# Patient Record
Sex: FEMALE
[De-identification: names, ages, dates, MRNs, and addresses within clinical notes are randomized per-mention and may not be internally consistent; named-entity substitution may affect disease eponyms.]

---

## 2019-08-06 ENCOUNTER — HOSPITAL ENCOUNTER (OUTPATIENT)
Dept: HOSPITAL 88 - RAD | Age: 49
End: 2019-08-06
Attending: INTERNAL MEDICINE
Payer: COMMERCIAL

## 2019-08-06 DIAGNOSIS — M47.812: Primary | ICD-10-CM

## 2019-08-06 DIAGNOSIS — M47.816: ICD-10-CM

## 2019-08-06 PROCEDURE — 72220 X-RAY EXAM SACRUM TAILBONE: CPT

## 2019-08-06 PROCEDURE — 72110 X-RAY EXAM L-2 SPINE 4/>VWS: CPT

## 2019-08-06 PROCEDURE — 72050 X-RAY EXAM NECK SPINE 4/5VWS: CPT

## 2019-08-06 NOTE — DIAGNOSTIC IMAGING REPORT
EXAMINATION:  CERVICAL SPINE 4 OR 5 VIEWS, SP LUMBAR, COMPLETE MIN 4VW, SACRUM

X-RAY    



INDICATION: Pain



COMPARISON: None

     

FINDINGS:



Cervical spine: No acute fracture or dislocation. Cervical spine alignment is

normal. The prevertebral soft tissues are normal in thickness. Mild multilevel

degenerative changes.



Lumbar spine: No acute fracture or dislocation. Alignment is anatomic.

Vertebral body heights are maintained. Minimal multilevel degenerative changes

particularly at L5-S1 with disc space narrowing and small osteophyte formation.

Nonobstructive bowel gas pattern. Status post cholecystectomy.



Sacrum: No acute fracture or dislocation. Bowel gas overlies the sacrum on AP

projection.



IMPRESSION: 

No acute osseous injury. Anatomic alignment.



Signed by: Henry Gillis MD on 8/6/2019 11:11 AM

## 2020-07-27 ENCOUNTER — HOSPITAL ENCOUNTER (OUTPATIENT)
Dept: HOSPITAL 88 - MAMMO | Age: 50
End: 2020-07-27
Attending: INTERNAL MEDICINE
Payer: COMMERCIAL

## 2020-07-27 DIAGNOSIS — Z12.31: Primary | ICD-10-CM

## 2020-07-27 PROCEDURE — 77067 SCR MAMMO BI INCL CAD: CPT

## 2021-05-14 ENCOUNTER — HOSPITAL ENCOUNTER (INPATIENT)
Dept: HOSPITAL 88 - ER | Age: 51
LOS: 3 days | Discharge: HOME | DRG: 393 | End: 2021-05-17
Attending: INTERNAL MEDICINE | Admitting: INTERNAL MEDICINE
Payer: COMMERCIAL

## 2021-05-14 VITALS — HEIGHT: 63 IN | BODY MASS INDEX: 31.18 KG/M2 | WEIGHT: 176 LBS

## 2021-05-14 VITALS — DIASTOLIC BLOOD PRESSURE: 80 MMHG | SYSTOLIC BLOOD PRESSURE: 125 MMHG

## 2021-05-14 VITALS — DIASTOLIC BLOOD PRESSURE: 67 MMHG | SYSTOLIC BLOOD PRESSURE: 140 MMHG

## 2021-05-14 DIAGNOSIS — K64.8: ICD-10-CM

## 2021-05-14 DIAGNOSIS — D12.0: ICD-10-CM

## 2021-05-14 DIAGNOSIS — D50.9: ICD-10-CM

## 2021-05-14 DIAGNOSIS — Z20.822: ICD-10-CM

## 2021-05-14 DIAGNOSIS — K25.4: ICD-10-CM

## 2021-05-14 DIAGNOSIS — K29.71: ICD-10-CM

## 2021-05-14 DIAGNOSIS — K31.7: Primary | ICD-10-CM

## 2021-05-14 DIAGNOSIS — D64.9: ICD-10-CM

## 2021-05-14 DIAGNOSIS — E11.9: ICD-10-CM

## 2021-05-14 DIAGNOSIS — K21.9: ICD-10-CM

## 2021-05-14 DIAGNOSIS — K20.90: ICD-10-CM

## 2021-05-14 DIAGNOSIS — K29.70: ICD-10-CM

## 2021-05-14 LAB
ALBUMIN SERPL-MCNC: 3.7 G/DL (ref 3.5–5)
ALBUMIN/GLOB SERPL: 1.2 {RATIO} (ref 0.8–2)
ALP SERPL-CCNC: 103 IU/L (ref 40–150)
ALT SERPL-CCNC: 37 IU/L (ref 0–55)
ANION GAP SERPL CALC-SCNC: 12.1 MMOL/L (ref 8–16)
BASOPHILS # BLD AUTO: 0 10*3/UL (ref 0–0.1)
BASOPHILS NFR BLD AUTO: 0.5 % (ref 0–1)
BUN SERPL-MCNC: 10 MG/DL (ref 7–26)
BUN/CREAT SERPL: 13 (ref 6–25)
CALCIUM SERPL-MCNC: 8.6 MG/DL (ref 8.4–10.2)
CHLORIDE SERPL-SCNC: 107 MMOL/L (ref 98–107)
CK MB SERPL-MCNC: 2 NG/ML (ref 0–5)
CK SERPL-CCNC: 550 IU/L (ref 29–168)
CO2 SERPL-SCNC: 25 MMOL/L (ref 22–29)
DEPRECATED INR PLAS: 0.88
DEPRECATED NEUTROPHILS # BLD AUTO: 3 10*3/UL (ref 2.1–6.9)
EGFRCR SERPLBLD CKD-EPI 2021: > 60 ML/MIN (ref 60–?)
EOSINOPHIL # BLD AUTO: 0.1 10*3/UL (ref 0–0.4)
EOSINOPHIL NFR BLD AUTO: 2.1 % (ref 0–6)
ERYTHROCYTE [DISTWIDTH] IN CORD BLOOD: 17.9 % (ref 11.7–14.4)
GLOBULIN PLAS-MCNC: 3.2 G/DL (ref 2.3–3.5)
GLUCOSE SERPLBLD-MCNC: 193 MG/DL (ref 74–118)
HCT VFR BLD AUTO: 20.5 % (ref 34.2–44.1)
HGB BLD-MCNC: 6 G/DL (ref 12–16)
LYMPHOCYTES # BLD: 2 10*3/UL (ref 1–3.2)
LYMPHOCYTES NFR BLD AUTO: 36.3 % (ref 18–39.1)
MCH RBC QN AUTO: 23.5 PG (ref 28–32)
MCHC RBC AUTO-ENTMCNC: 29.3 G/DL (ref 31–35)
MCV RBC AUTO: 80.4 FL (ref 81–99)
MONOCYTES # BLD AUTO: 0.4 10*3/UL (ref 0.2–0.8)
MONOCYTES NFR BLD AUTO: 7.3 % (ref 4.4–11.3)
NEUTS SEG NFR BLD AUTO: 53.3 % (ref 38.7–80)
PLATELET # BLD AUTO: 359 X10E3/UL (ref 140–360)
POTASSIUM SERPL-SCNC: 4.1 MMOL/L (ref 3.5–5.1)
PROTHROMBIN TIME: 12.5 SECONDS (ref 11.9–14.5)
RBC # BLD AUTO: 2.55 X10E6/UL (ref 3.6–5.1)
SODIUM SERPL-SCNC: 140 MMOL/L (ref 136–145)

## 2021-05-14 PROCEDURE — 82270 OCCULT BLOOD FECES: CPT

## 2021-05-14 PROCEDURE — 83036 HEMOGLOBIN GLYCOSYLATED A1C: CPT

## 2021-05-14 PROCEDURE — 85018 HEMOGLOBIN: CPT

## 2021-05-14 PROCEDURE — 82607 VITAMIN B-12: CPT

## 2021-05-14 PROCEDURE — 84100 ASSAY OF PHOSPHORUS: CPT

## 2021-05-14 PROCEDURE — 88312 SPECIAL STAINS GROUP 1: CPT

## 2021-05-14 PROCEDURE — 43239 EGD BIOPSY SINGLE/MULTIPLE: CPT

## 2021-05-14 PROCEDURE — 85045 AUTOMATED RETICULOCYTE COUNT: CPT

## 2021-05-14 PROCEDURE — 45380 COLONOSCOPY AND BIOPSY: CPT

## 2021-05-14 PROCEDURE — 36415 COLL VENOUS BLD VENIPUNCTURE: CPT

## 2021-05-14 PROCEDURE — 80053 COMPREHEN METABOLIC PANEL: CPT

## 2021-05-14 PROCEDURE — 82728 ASSAY OF FERRITIN: CPT

## 2021-05-14 PROCEDURE — 84484 ASSAY OF TROPONIN QUANT: CPT

## 2021-05-14 PROCEDURE — 82553 CREATINE MB FRACTION: CPT

## 2021-05-14 PROCEDURE — 99284 EMERGENCY DEPT VISIT MOD MDM: CPT

## 2021-05-14 PROCEDURE — 86850 RBC ANTIBODY SCREEN: CPT

## 2021-05-14 PROCEDURE — 85610 PROTHROMBIN TIME: CPT

## 2021-05-14 PROCEDURE — 86920 COMPATIBILITY TEST SPIN: CPT

## 2021-05-14 PROCEDURE — 84466 ASSAY OF TRANSFERRIN: CPT

## 2021-05-14 PROCEDURE — 88305 TISSUE EXAM BY PATHOLOGIST: CPT

## 2021-05-14 PROCEDURE — 83735 ASSAY OF MAGNESIUM: CPT

## 2021-05-14 PROCEDURE — 83540 ASSAY OF IRON: CPT

## 2021-05-14 PROCEDURE — 82550 ASSAY OF CK (CPK): CPT

## 2021-05-14 PROCEDURE — 85014 HEMATOCRIT: CPT

## 2021-05-14 PROCEDURE — 85025 COMPLETE CBC W/AUTO DIFF WBC: CPT

## 2021-05-14 PROCEDURE — 86900 BLOOD TYPING SEROLOGIC ABO: CPT

## 2021-05-14 PROCEDURE — 80048 BASIC METABOLIC PNL TOTAL CA: CPT

## 2021-05-14 PROCEDURE — 82746 ASSAY OF FOLIC ACID SERUM: CPT

## 2021-05-15 VITALS — SYSTOLIC BLOOD PRESSURE: 124 MMHG | DIASTOLIC BLOOD PRESSURE: 84 MMHG

## 2021-05-15 VITALS — DIASTOLIC BLOOD PRESSURE: 84 MMHG | SYSTOLIC BLOOD PRESSURE: 124 MMHG

## 2021-05-15 VITALS — SYSTOLIC BLOOD PRESSURE: 145 MMHG | DIASTOLIC BLOOD PRESSURE: 81 MMHG

## 2021-05-15 VITALS — DIASTOLIC BLOOD PRESSURE: 67 MMHG | SYSTOLIC BLOOD PRESSURE: 140 MMHG

## 2021-05-15 VITALS — DIASTOLIC BLOOD PRESSURE: 80 MMHG | SYSTOLIC BLOOD PRESSURE: 129 MMHG

## 2021-05-15 VITALS — DIASTOLIC BLOOD PRESSURE: 75 MMHG | SYSTOLIC BLOOD PRESSURE: 120 MMHG

## 2021-05-15 VITALS — SYSTOLIC BLOOD PRESSURE: 134 MMHG | DIASTOLIC BLOOD PRESSURE: 77 MMHG

## 2021-05-15 VITALS — SYSTOLIC BLOOD PRESSURE: 128 MMHG | DIASTOLIC BLOOD PRESSURE: 80 MMHG

## 2021-05-15 LAB
ALBUMIN SERPL-MCNC: 3.5 G/DL (ref 3.5–5)
ALBUMIN/GLOB SERPL: 1.2 {RATIO} (ref 0.8–2)
ALP SERPL-CCNC: 85 IU/L (ref 40–150)
ALT SERPL-CCNC: 36 IU/L (ref 0–55)
ANION GAP SERPL CALC-SCNC: 12.7 MMOL/L (ref 8–16)
BASOPHILS # BLD AUTO: 0 10*3/UL (ref 0–0.1)
BASOPHILS NFR BLD AUTO: 0.8 % (ref 0–1)
BUN SERPL-MCNC: 6 MG/DL (ref 7–26)
BUN/CREAT SERPL: 9 (ref 6–25)
CALCIUM SERPL-MCNC: 8.7 MG/DL (ref 8.4–10.2)
CHLORIDE SERPL-SCNC: 109 MMOL/L (ref 98–107)
CO2 SERPL-SCNC: 22 MMOL/L (ref 22–29)
DEPRECATED NEUTROPHILS # BLD AUTO: 2.6 10*3/UL (ref 2.1–6.9)
EGFRCR SERPLBLD CKD-EPI 2021: > 60 ML/MIN (ref 60–?)
EOSINOPHIL # BLD AUTO: 0.1 10*3/UL (ref 0–0.4)
EOSINOPHIL NFR BLD AUTO: 2.3 % (ref 0–6)
ERYTHROCYTE [DISTWIDTH] IN CORD BLOOD: 17.2 % (ref 11.7–14.4)
FERRITIN SERPL-MCNC: 2.33 NG/ML (ref 4.63–204)
GLOBULIN PLAS-MCNC: 3 G/DL (ref 2.3–3.5)
GLUCOSE SERPLBLD-MCNC: 160 MG/DL (ref 74–118)
HCT VFR BLD AUTO: 29.5 % (ref 34.2–44.1)
HCT VFR BLD AUTO: 30.8 % (ref 34.2–44.1)
HGB BLD-MCNC: 9.1 G/DL (ref 12–16)
HGB BLD-MCNC: 9.6 G/DL (ref 12–16)
IRON SATN MFR SERPL: 3 % (ref 15–50)
IRON SERPL-MCNC: 15 UG/DL (ref 50–170)
LYMPHOCYTES # BLD: 1.6 10*3/UL (ref 1–3.2)
LYMPHOCYTES NFR BLD AUTO: 32.6 % (ref 18–39.1)
MCH RBC QN AUTO: 25.4 PG (ref 28–32)
MCHC RBC AUTO-ENTMCNC: 30.8 G/DL (ref 31–35)
MCV RBC AUTO: 82.4 FL (ref 81–99)
MONOCYTES # BLD AUTO: 0.5 10*3/UL (ref 0.2–0.8)
MONOCYTES NFR BLD AUTO: 9.4 % (ref 4.4–11.3)
NEUTS SEG NFR BLD AUTO: 54.1 % (ref 38.7–80)
PLATELET # BLD AUTO: 322 X10E3/UL (ref 140–360)
POTASSIUM SERPL-SCNC: 3.7 MMOL/L (ref 3.5–5.1)
RBC # BLD AUTO: 3.58 X10E6/UL (ref 3.6–5.1)
SODIUM SERPL-SCNC: 140 MMOL/L (ref 136–145)
TIBC SERPL-MCNC: 556 UG/DL (ref 261–478)
TRANSFERRIN SERPL-MCNC: 397 MG/DL (ref 180–382)

## 2021-05-15 PROCEDURE — 30233N1 TRANSFUSION OF NONAUTOLOGOUS RED BLOOD CELLS INTO PERIPHERAL VEIN, PERCUTANEOUS APPROACH: ICD-10-PCS | Performed by: INTERNAL MEDICINE

## 2021-05-15 RX ADMIN — SODIUM CHLORIDE SCH MLS/HR: 900 INJECTION INTRAVENOUS at 17:35

## 2021-05-15 RX ADMIN — SODIUM CHLORIDE SCH MLS/HR: 900 INJECTION INTRAVENOUS at 14:48

## 2021-05-16 VITALS — DIASTOLIC BLOOD PRESSURE: 72 MMHG | SYSTOLIC BLOOD PRESSURE: 98 MMHG

## 2021-05-16 VITALS — SYSTOLIC BLOOD PRESSURE: 132 MMHG | DIASTOLIC BLOOD PRESSURE: 79 MMHG

## 2021-05-16 VITALS — DIASTOLIC BLOOD PRESSURE: 78 MMHG | SYSTOLIC BLOOD PRESSURE: 143 MMHG

## 2021-05-16 VITALS — SYSTOLIC BLOOD PRESSURE: 112 MMHG | DIASTOLIC BLOOD PRESSURE: 71 MMHG

## 2021-05-16 VITALS — SYSTOLIC BLOOD PRESSURE: 98 MMHG | DIASTOLIC BLOOD PRESSURE: 72 MMHG

## 2021-05-16 VITALS — DIASTOLIC BLOOD PRESSURE: 69 MMHG | SYSTOLIC BLOOD PRESSURE: 118 MMHG

## 2021-05-16 VITALS — SYSTOLIC BLOOD PRESSURE: 137 MMHG | DIASTOLIC BLOOD PRESSURE: 92 MMHG

## 2021-05-16 VITALS — DIASTOLIC BLOOD PRESSURE: 83 MMHG | SYSTOLIC BLOOD PRESSURE: 111 MMHG

## 2021-05-16 VITALS — DIASTOLIC BLOOD PRESSURE: 79 MMHG | SYSTOLIC BLOOD PRESSURE: 132 MMHG

## 2021-05-16 LAB
ANION GAP SERPL CALC-SCNC: 15.5 MMOL/L (ref 8–16)
BASOPHILS # BLD AUTO: 0.1 10*3/UL (ref 0–0.1)
BASOPHILS NFR BLD AUTO: 0.7 % (ref 0–1)
BUN SERPL-MCNC: 9 MG/DL (ref 7–26)
BUN/CREAT SERPL: 11 (ref 6–25)
CALCIUM SERPL-MCNC: 9.5 MG/DL (ref 8.4–10.2)
CHLORIDE SERPL-SCNC: 107 MMOL/L (ref 98–107)
CO2 SERPL-SCNC: 21 MMOL/L (ref 22–29)
DEPRECATED NEUTROPHILS # BLD AUTO: 4.3 10*3/UL (ref 2.1–6.9)
DEPRECATED PHOSPHATE SERPL-MCNC: 4.7 MG/DL (ref 2.3–4.7)
EGFRCR SERPLBLD CKD-EPI 2021: > 60 ML/MIN (ref 60–?)
EOSINOPHIL # BLD AUTO: 0.2 10*3/UL (ref 0–0.4)
EOSINOPHIL NFR BLD AUTO: 2.4 % (ref 0–6)
ERYTHROCYTE [DISTWIDTH] IN CORD BLOOD: 17.5 % (ref 11.7–14.4)
GLUCOSE SERPLBLD-MCNC: 116 MG/DL (ref 74–118)
HCT VFR BLD AUTO: 36.9 % (ref 34.2–44.1)
HGB BLD-MCNC: 11.2 G/DL (ref 12–16)
LYMPHOCYTES # BLD: 1.8 10*3/UL (ref 1–3.2)
LYMPHOCYTES NFR BLD AUTO: 25.4 % (ref 18–39.1)
MAGNESIUM SERPL-MCNC: 2.8 MG/DL (ref 1.3–2.1)
MCH RBC QN AUTO: 25.5 PG (ref 28–32)
MCHC RBC AUTO-ENTMCNC: 30.4 G/DL (ref 31–35)
MCV RBC AUTO: 83.9 FL (ref 81–99)
MONOCYTES # BLD AUTO: 0.8 10*3/UL (ref 0.2–0.8)
MONOCYTES NFR BLD AUTO: 10.7 % (ref 4.4–11.3)
NEUTS SEG NFR BLD AUTO: 60.2 % (ref 38.7–80)
PLATELET # BLD AUTO: 392 X10E3/UL (ref 140–360)
POTASSIUM SERPL-SCNC: 3.5 MMOL/L (ref 3.5–5.1)
RBC # BLD AUTO: 4.4 X10E6/UL (ref 3.6–5.1)
SODIUM SERPL-SCNC: 140 MMOL/L (ref 136–145)

## 2021-05-16 PROCEDURE — 0DB78ZX EXCISION OF STOMACH, PYLORUS, VIA NATURAL OR ARTIFICIAL OPENING ENDOSCOPIC, DIAGNOSTIC: ICD-10-PCS | Performed by: INTERNAL MEDICINE

## 2021-05-16 PROCEDURE — 0DB98ZX EXCISION OF DUODENUM, VIA NATURAL OR ARTIFICIAL OPENING ENDOSCOPIC, DIAGNOSTIC: ICD-10-PCS | Performed by: INTERNAL MEDICINE

## 2021-05-16 PROCEDURE — 0DBH8ZX EXCISION OF CECUM, VIA NATURAL OR ARTIFICIAL OPENING ENDOSCOPIC, DIAGNOSTIC: ICD-10-PCS | Performed by: INTERNAL MEDICINE

## 2021-05-16 RX ADMIN — SODIUM CHLORIDE SCH MLS/HR: 900 INJECTION INTRAVENOUS at 05:25

## 2021-05-16 RX ADMIN — SODIUM CHLORIDE SCH MLS/HR: 900 INJECTION INTRAVENOUS at 15:26

## 2021-05-16 RX ADMIN — SODIUM CHLORIDE SCH MLS/HR: 900 INJECTION INTRAVENOUS at 08:18

## 2021-05-16 RX ADMIN — SODIUM CHLORIDE SCH MLS/HR: 900 INJECTION INTRAVENOUS at 00:17

## 2021-05-16 RX ADMIN — SODIUM CHLORIDE SCH MLS/HR: 900 INJECTION INTRAVENOUS at 18:20

## 2021-05-16 RX ADMIN — SODIUM CHLORIDE SCH MLS/HR: 9 INJECTION, SOLUTION INTRAVENOUS at 10:45

## 2021-05-17 VITALS — SYSTOLIC BLOOD PRESSURE: 102 MMHG | DIASTOLIC BLOOD PRESSURE: 57 MMHG

## 2021-05-17 VITALS — SYSTOLIC BLOOD PRESSURE: 122 MMHG | DIASTOLIC BLOOD PRESSURE: 71 MMHG

## 2021-05-17 VITALS — DIASTOLIC BLOOD PRESSURE: 57 MMHG | SYSTOLIC BLOOD PRESSURE: 93 MMHG

## 2021-05-17 VITALS — SYSTOLIC BLOOD PRESSURE: 139 MMHG | DIASTOLIC BLOOD PRESSURE: 74 MMHG

## 2021-05-17 VITALS — SYSTOLIC BLOOD PRESSURE: 112 MMHG | DIASTOLIC BLOOD PRESSURE: 63 MMHG

## 2021-05-17 LAB
ANION GAP SERPL CALC-SCNC: 16.5 MMOL/L (ref 8–16)
BASOPHILS # BLD AUTO: 0.1 10*3/UL (ref 0–0.1)
BASOPHILS NFR BLD AUTO: 0.6 % (ref 0–1)
BUN SERPL-MCNC: 11 MG/DL (ref 7–26)
BUN/CREAT SERPL: 15 (ref 6–25)
CALCIUM SERPL-MCNC: 9.1 MG/DL (ref 8.4–10.2)
CHLORIDE SERPL-SCNC: 108 MMOL/L (ref 98–107)
CO2 SERPL-SCNC: 19 MMOL/L (ref 22–29)
DEPRECATED NEUTROPHILS # BLD AUTO: 5.5 10*3/UL (ref 2.1–6.9)
EGFRCR SERPLBLD CKD-EPI 2021: > 60 ML/MIN (ref 60–?)
EOSINOPHIL # BLD AUTO: 0.1 10*3/UL (ref 0–0.4)
EOSINOPHIL NFR BLD AUTO: 1 % (ref 0–6)
ERYTHROCYTE [DISTWIDTH] IN CORD BLOOD: 17.6 % (ref 11.7–14.4)
GLUCOSE SERPLBLD-MCNC: 89 MG/DL (ref 74–118)
HCT VFR BLD AUTO: 34.1 % (ref 34.2–44.1)
HGB BLD-MCNC: 10.3 G/DL (ref 12–16)
LYMPHOCYTES # BLD: 1.3 10*3/UL (ref 1–3.2)
LYMPHOCYTES NFR BLD AUTO: 16.6 % (ref 18–39.1)
MCH RBC QN AUTO: 25.3 PG (ref 28–32)
MCHC RBC AUTO-ENTMCNC: 30.2 G/DL (ref 31–35)
MCV RBC AUTO: 83.8 FL (ref 81–99)
MONOCYTES # BLD AUTO: 0.8 10*3/UL (ref 0.2–0.8)
MONOCYTES NFR BLD AUTO: 10.4 % (ref 4.4–11.3)
NEUTS SEG NFR BLD AUTO: 71 % (ref 38.7–80)
PLATELET # BLD AUTO: 339 X10E3/UL (ref 140–360)
POTASSIUM SERPL-SCNC: 3.5 MMOL/L (ref 3.5–5.1)
RBC # BLD AUTO: 4.07 X10E6/UL (ref 3.6–5.1)
SODIUM SERPL-SCNC: 140 MMOL/L (ref 136–145)

## 2021-05-17 RX ADMIN — SODIUM CHLORIDE SCH MLS/HR: 9 INJECTION, SOLUTION INTRAVENOUS at 10:00

## 2021-05-17 RX ADMIN — SODIUM CHLORIDE SCH MLS/HR: 900 INJECTION INTRAVENOUS at 05:41

## 2021-05-17 RX ADMIN — SODIUM CHLORIDE SCH MLS/HR: 900 INJECTION INTRAVENOUS at 00:30

## 2021-07-27 ENCOUNTER — HOSPITAL ENCOUNTER (OUTPATIENT)
Dept: HOSPITAL 88 - DX | Age: 51
End: 2021-07-27
Attending: INTERNAL MEDICINE
Payer: COMMERCIAL

## 2021-07-27 DIAGNOSIS — D53.8: Primary | ICD-10-CM

## 2021-07-27 PROCEDURE — 74250 X-RAY XM SM INT 1CNTRST STD: CPT

## 2025-06-22 ENCOUNTER — HOSPITAL ENCOUNTER (EMERGENCY)
Dept: HOSPITAL 88 - ER | Age: 55
Discharge: HOME | End: 2025-06-22
Payer: COMMERCIAL

## 2025-06-22 VITALS
OXYGEN SATURATION: 98 % | DIASTOLIC BLOOD PRESSURE: 88 MMHG | HEART RATE: 70 BPM | SYSTOLIC BLOOD PRESSURE: 145 MMHG | RESPIRATION RATE: 16 BRPM | TEMPERATURE: 98.1 F

## 2025-06-22 VITALS — HEIGHT: 63 IN | BODY MASS INDEX: 31.18 KG/M2 | WEIGHT: 176 LBS

## 2025-06-22 VITALS — TEMPERATURE: 98.1 F | HEART RATE: 71 BPM

## 2025-06-22 DIAGNOSIS — E11.40: ICD-10-CM

## 2025-06-22 DIAGNOSIS — N76.4: Primary | ICD-10-CM

## 2025-06-22 DIAGNOSIS — D64.9: ICD-10-CM

## 2025-06-22 DIAGNOSIS — K21.9: ICD-10-CM

## 2025-06-22 PROCEDURE — 99283 EMERGENCY DEPT VISIT LOW MDM: CPT
